# Patient Record
Sex: FEMALE | Race: ASIAN | NOT HISPANIC OR LATINO | URBAN - METROPOLITAN AREA
[De-identification: names, ages, dates, MRNs, and addresses within clinical notes are randomized per-mention and may not be internally consistent; named-entity substitution may affect disease eponyms.]

---

## 2019-06-26 ENCOUNTER — OUTPATIENT (OUTPATIENT)
Dept: OUTPATIENT SERVICES | Facility: HOSPITAL | Age: 39
LOS: 1 days | End: 2019-06-26

## 2019-06-26 VITALS
HEIGHT: 63 IN | DIASTOLIC BLOOD PRESSURE: 70 MMHG | HEART RATE: 88 BPM | WEIGHT: 160.06 LBS | TEMPERATURE: 99 F | RESPIRATION RATE: 16 BRPM | SYSTOLIC BLOOD PRESSURE: 110 MMHG

## 2019-06-26 DIAGNOSIS — O24.414 GESTATIONAL DIABETES MELLITUS IN PREGNANCY, INSULIN CONTROLLED: ICD-10-CM

## 2019-06-26 DIAGNOSIS — O24.419 GESTATIONAL DIABETES MELLITUS IN PREGNANCY, UNSPECIFIED CONTROL: ICD-10-CM

## 2019-06-26 DIAGNOSIS — Z98.890 OTHER SPECIFIED POSTPROCEDURAL STATES: Chronic | ICD-10-CM

## 2019-06-26 LAB
ANION GAP SERPL CALC-SCNC: 12 MMO/L — SIGNIFICANT CHANGE UP (ref 7–14)
APPEARANCE UR: CLEAR — SIGNIFICANT CHANGE UP
BILIRUB UR-MCNC: NEGATIVE — SIGNIFICANT CHANGE UP
BLD GP AB SCN SERPL QL: NEGATIVE — SIGNIFICANT CHANGE UP
BLOOD UR QL VISUAL: NEGATIVE — SIGNIFICANT CHANGE UP
BUN SERPL-MCNC: 10 MG/DL — SIGNIFICANT CHANGE UP (ref 7–23)
CALCIUM SERPL-MCNC: 9.8 MG/DL — SIGNIFICANT CHANGE UP (ref 8.4–10.5)
CHLORIDE SERPL-SCNC: 103 MMOL/L — SIGNIFICANT CHANGE UP (ref 98–107)
CO2 SERPL-SCNC: 20 MMOL/L — LOW (ref 22–31)
COLOR SPEC: SIGNIFICANT CHANGE UP
CREAT SERPL-MCNC: 0.59 MG/DL — SIGNIFICANT CHANGE UP (ref 0.5–1.3)
GLUCOSE SERPL-MCNC: 91 MG/DL — SIGNIFICANT CHANGE UP (ref 70–99)
GLUCOSE UR-MCNC: NEGATIVE — SIGNIFICANT CHANGE UP
HCT VFR BLD CALC: 36.8 % — SIGNIFICANT CHANGE UP (ref 34.5–45)
HGB BLD-MCNC: 12.6 G/DL — SIGNIFICANT CHANGE UP (ref 11.5–15.5)
KETONES UR-MCNC: NEGATIVE — SIGNIFICANT CHANGE UP
LEUKOCYTE ESTERASE UR-ACNC: NEGATIVE — SIGNIFICANT CHANGE UP
MCHC RBC-ENTMCNC: 31 PG — SIGNIFICANT CHANGE UP (ref 27–34)
MCHC RBC-ENTMCNC: 34.2 % — SIGNIFICANT CHANGE UP (ref 32–36)
MCV RBC AUTO: 90.4 FL — SIGNIFICANT CHANGE UP (ref 80–100)
NITRITE UR-MCNC: NEGATIVE — SIGNIFICANT CHANGE UP
NRBC # FLD: 0 K/UL — SIGNIFICANT CHANGE UP (ref 0–0)
PH UR: 7 — SIGNIFICANT CHANGE UP (ref 5–8)
PLATELET # BLD AUTO: 419 K/UL — HIGH (ref 150–400)
PMV BLD: 9.7 FL — SIGNIFICANT CHANGE UP (ref 7–13)
POTASSIUM SERPL-MCNC: 4 MMOL/L — SIGNIFICANT CHANGE UP (ref 3.5–5.3)
POTASSIUM SERPL-SCNC: 4 MMOL/L — SIGNIFICANT CHANGE UP (ref 3.5–5.3)
PROT UR-MCNC: 20 — SIGNIFICANT CHANGE UP
RBC # BLD: 4.07 M/UL — SIGNIFICANT CHANGE UP (ref 3.8–5.2)
RBC # FLD: 14 % — SIGNIFICANT CHANGE UP (ref 10.3–14.5)
RH IG SCN BLD-IMP: POSITIVE — SIGNIFICANT CHANGE UP
SODIUM SERPL-SCNC: 135 MMOL/L — SIGNIFICANT CHANGE UP (ref 135–145)
SP GR SPEC: 1.02 — SIGNIFICANT CHANGE UP (ref 1–1.04)
UROBILINOGEN FLD QL: NORMAL — SIGNIFICANT CHANGE UP
WBC # BLD: 12.31 K/UL — HIGH (ref 3.8–10.5)
WBC # FLD AUTO: 12.31 K/UL — HIGH (ref 3.8–10.5)

## 2019-06-26 RX ORDER — FAMOTIDINE 10 MG/ML
1 INJECTION INTRAVENOUS
Qty: 0 | Refills: 0 | DISCHARGE

## 2019-06-26 NOTE — OB PST NOTE - NSHPPHYSICALEXAM_GEN_ALL_CORE
Constitutional: Well Developed, Well Groomed, Well Nourished, No Distress    Eyes: PERRL, EOMI, conjunctiva clear    Mouth & Gums: Normal, moist    Pharynx: No tenderness, discharge, or peritonsillar abscess    Neck: Supple, no JVD    Breast: Normal shape    Back: Normal shape, ROM intact, strength intact, no vertebral tenderness    Respiratory: Airway patent, breath sounds equal, good air movement, respiration non-labored, clear to auscultation bilateral, no chest wall tenderness, no intercostal retractions, no rales, no wheezes, no rhonchi, no subcutaneous emphysema    Cardiovascular:  Regular rate and rhythm, no rubs or murmur, normal PMI    Gastrointestinal: + Gravid abdomen. Soft, non-tender, non distention,  bowel sound normal    Extremities: No clubbing, cyanosis, or pedal edema    Vascular:  Radial Pulse normal, DP pulse normal    Neurological: alert & oriented x 3, sensation intact, deep reflexes intact, cranial nerve intact, normal strength    Skin: warm and dry, normal color    Lymph Nodes: normal posterior cervical lymph node, normal anterior cervical lymph node, normal supraclavicular lymph node    Musculoskeletal: ROM intact, no joint swelling, warmth, or calf tenderness. Normal strength    Psychiatric: normal affect, normal behavior

## 2019-06-26 NOTE — OB PST NOTE - HISTORY OF PRESENT ILLNESS
37 yo pregnant female presents to pre surgical testing.  Pt reports she is , GALA 19.  Pt denies vaginal bleeding, spotting, or leakage of amniotic fluid.  Pt reports positive fetal movement today.  Pt denies regular uterine contractions.  Pt reports she was diagnosed with gestational DM about 2 months ago, currently taking metformin BID. Pt is scheduled for induction of labor on 19.

## 2019-06-26 NOTE — OB PST NOTE - PROBLEM SELECTOR PLAN 1
Pt is scheduled for induction of labor on 6/30/19.  Pre induction instructions given and pt able to verbalize understanding.  CBC BMP UA RPR T&S pending.

## 2019-06-27 LAB — T PALLIDUM AB TITR SER: NEGATIVE — SIGNIFICANT CHANGE UP

## 2019-06-30 ENCOUNTER — INPATIENT (INPATIENT)
Facility: HOSPITAL | Age: 39
LOS: 3 days | Discharge: HOME | End: 2019-07-04
Attending: OBSTETRICS & GYNECOLOGY | Admitting: OBSTETRICS & GYNECOLOGY

## 2019-06-30 VITALS
SYSTOLIC BLOOD PRESSURE: 116 MMHG | HEART RATE: 96 BPM | TEMPERATURE: 99 F | OXYGEN SATURATION: 97 % | DIASTOLIC BLOOD PRESSURE: 77 MMHG

## 2019-06-30 DIAGNOSIS — Z98.890 OTHER SPECIFIED POSTPROCEDURAL STATES: Chronic | ICD-10-CM

## 2019-06-30 DIAGNOSIS — O16.9 UNSPECIFIED MATERNAL HYPERTENSION, UNSPECIFIED TRIMESTER: ICD-10-CM

## 2019-07-01 PROBLEM — O24.419 GESTATIONAL DIABETES MELLITUS IN PREGNANCY, UNSPECIFIED CONTROL: Chronic | Status: ACTIVE | Noted: 2019-06-26

## 2019-07-01 LAB
BASOPHILS # BLD AUTO: 0.05 K/UL — SIGNIFICANT CHANGE UP (ref 0–0.2)
BASOPHILS NFR BLD AUTO: 0.4 % — SIGNIFICANT CHANGE UP (ref 0–2)
EOSINOPHIL # BLD AUTO: 0.19 K/UL — SIGNIFICANT CHANGE UP (ref 0–0.5)
EOSINOPHIL NFR BLD AUTO: 1.5 % — SIGNIFICANT CHANGE UP (ref 0–6)
HCT VFR BLD CALC: 35.8 % — SIGNIFICANT CHANGE UP (ref 34.5–45)
HGB BLD-MCNC: 12.3 G/DL — SIGNIFICANT CHANGE UP (ref 11.5–15.5)
IMM GRANULOCYTES NFR BLD AUTO: 1.2 % — SIGNIFICANT CHANGE UP (ref 0–1.5)
LYMPHOCYTES # BLD AUTO: 2.69 K/UL — SIGNIFICANT CHANGE UP (ref 1–3.3)
LYMPHOCYTES # BLD AUTO: 20.6 % — SIGNIFICANT CHANGE UP (ref 13–44)
MCHC RBC-ENTMCNC: 31.3 PG — SIGNIFICANT CHANGE UP (ref 27–34)
MCHC RBC-ENTMCNC: 34.4 % — SIGNIFICANT CHANGE UP (ref 32–36)
MCV RBC AUTO: 91.1 FL — SIGNIFICANT CHANGE UP (ref 80–100)
MONOCYTES # BLD AUTO: 1.12 K/UL — HIGH (ref 0–0.9)
MONOCYTES NFR BLD AUTO: 8.6 % — SIGNIFICANT CHANGE UP (ref 2–14)
NEUTROPHILS # BLD AUTO: 8.86 K/UL — HIGH (ref 1.8–7.4)
NEUTROPHILS NFR BLD AUTO: 67.7 % — SIGNIFICANT CHANGE UP (ref 43–77)
NRBC # FLD: 0 K/UL — SIGNIFICANT CHANGE UP (ref 0–0)
PLATELET # BLD AUTO: 378 K/UL — SIGNIFICANT CHANGE UP (ref 150–400)
PMV BLD: 9.6 FL — SIGNIFICANT CHANGE UP (ref 7–13)
RBC # BLD: 3.93 M/UL — SIGNIFICANT CHANGE UP (ref 3.8–5.2)
RBC # FLD: 13.9 % — SIGNIFICANT CHANGE UP (ref 10.3–14.5)
RH IG SCN BLD-IMP: POSITIVE — SIGNIFICANT CHANGE UP
T PALLIDUM AB TITR SER: NEGATIVE — SIGNIFICANT CHANGE UP
WBC # BLD: 13.07 K/UL — HIGH (ref 3.8–10.5)
WBC # FLD AUTO: 13.07 K/UL — HIGH (ref 3.8–10.5)

## 2019-07-01 RX ORDER — VANCOMYCIN HCL 1 G
1000 VIAL (EA) INTRAVENOUS EVERY 12 HOURS
Refills: 0 | Status: DISCONTINUED | OUTPATIENT
Start: 2019-07-01 | End: 2019-07-02

## 2019-07-01 RX ORDER — SODIUM CHLORIDE 9 MG/ML
1000 INJECTION, SOLUTION INTRAVENOUS
Refills: 0 | Status: DISCONTINUED | OUTPATIENT
Start: 2019-07-01 | End: 2019-07-01

## 2019-07-01 RX ORDER — SODIUM CHLORIDE 9 MG/ML
1000 INJECTION, SOLUTION INTRAVENOUS
Refills: 0 | Status: COMPLETED | OUTPATIENT
Start: 2019-07-01 | End: 2019-07-01

## 2019-07-01 RX ORDER — VANCOMYCIN HCL 1 G
VIAL (EA) INTRAVENOUS
Refills: 0 | Status: DISCONTINUED | OUTPATIENT
Start: 2019-07-01 | End: 2019-07-02

## 2019-07-01 RX ORDER — VANCOMYCIN HCL 1 G
1000 VIAL (EA) INTRAVENOUS ONCE
Refills: 0 | Status: COMPLETED | OUTPATIENT
Start: 2019-07-01 | End: 2019-07-01

## 2019-07-01 RX ORDER — OXYTOCIN 10 UNIT/ML
333.33 VIAL (ML) INJECTION
Qty: 20 | Refills: 0 | Status: COMPLETED | OUTPATIENT
Start: 2019-07-01 | End: 2019-07-01

## 2019-07-01 RX ORDER — AMPICILLIN TRIHYDRATE 250 MG
2 CAPSULE ORAL ONCE
Refills: 0 | Status: DISCONTINUED | OUTPATIENT
Start: 2019-07-01 | End: 2019-07-01

## 2019-07-01 RX ORDER — AMPICILLIN TRIHYDRATE 250 MG
1 CAPSULE ORAL EVERY 4 HOURS
Refills: 0 | Status: DISCONTINUED | OUTPATIENT
Start: 2019-07-01 | End: 2019-07-01

## 2019-07-01 RX ORDER — SODIUM CHLORIDE 9 MG/ML
1000 INJECTION INTRAMUSCULAR; INTRAVENOUS; SUBCUTANEOUS
Refills: 0 | Status: DISCONTINUED | OUTPATIENT
Start: 2019-07-01 | End: 2019-07-01

## 2019-07-01 RX ORDER — CITRIC ACID/SODIUM CITRATE 300-500 MG
15 SOLUTION, ORAL ORAL EVERY 6 HOURS
Refills: 0 | Status: DISCONTINUED | OUTPATIENT
Start: 2019-07-01 | End: 2019-07-02

## 2019-07-01 RX ORDER — OXYTOCIN 10 UNIT/ML
333.33 VIAL (ML) INJECTION
Qty: 20 | Refills: 0 | Status: DISCONTINUED | OUTPATIENT
Start: 2019-07-01 | End: 2019-07-01

## 2019-07-01 RX ORDER — SODIUM CHLORIDE 9 MG/ML
1000 INJECTION, SOLUTION INTRAVENOUS
Refills: 0 | Status: DISCONTINUED | OUTPATIENT
Start: 2019-07-01 | End: 2019-07-02

## 2019-07-01 RX ORDER — SODIUM CHLORIDE 9 MG/ML
1000 INJECTION INTRAMUSCULAR; INTRAVENOUS; SUBCUTANEOUS
Refills: 0 | Status: COMPLETED | OUTPATIENT
Start: 2019-07-01 | End: 2019-07-01

## 2019-07-01 RX ORDER — CITRIC ACID/SODIUM CITRATE 300-500 MG
15 SOLUTION, ORAL ORAL EVERY 6 HOURS
Refills: 0 | Status: DISCONTINUED | OUTPATIENT
Start: 2019-07-01 | End: 2019-07-01

## 2019-07-01 RX ADMIN — SODIUM CHLORIDE 125 MILLILITER(S): 9 INJECTION INTRAMUSCULAR; INTRAVENOUS; SUBCUTANEOUS at 21:16

## 2019-07-01 RX ADMIN — Medication 250 MILLIGRAM(S): at 04:08

## 2019-07-01 RX ADMIN — SODIUM CHLORIDE 125 MILLILITER(S): 9 INJECTION, SOLUTION INTRAVENOUS at 08:20

## 2019-07-01 RX ADMIN — Medication 250 MILLIGRAM(S): at 16:30

## 2019-07-01 NOTE — CHART NOTE - NSCHARTNOTEFT_GEN_A_CORE
R3 labor Note    Pt evaluated for increased pain with ctx. Pt SROM-clear R3 labor Note    Pt evaluated for increased pain with ctx. Pt SROM-clear    SVE: 1-2/60/-3  EFM: 180 bpm/mod saeid/+accel/-decel  [place]: irregular 3-6 min    Vital Signs Last 24 Hrs  T(C): 37.0 (01 Jul 2019 21:46), Max: 37.1 (30 Jun 2019 23:25)  T(F): 98.6 (01 Jul 2019 21:46), Max: 98.8 (01 Jul 2019 01:10)  HR: 80 (01 Jul 2019 17:43) (71 - 94)  BP: 119/79 (01 Jul 2019 17:43) (107/67 - 119/79)  BP(mean): --  RR: 16 (01 Jul 2019 01:10) (16 - 16)  SpO2: 97% (01 Jul 2019 04:06) (97% - 98%)    I&O's Detail    30 Jun 2019 07:01  -  01 Jul 2019 07:00  --------------------------------------------------------  IN:    dextrose 5% + sodium chloride 0.9%: 500 mL    IV PiggyBack: 250 mL    lactated ringers.: 500 mL  Total IN: 1250 mL    OUT:  Total OUT: 0 mL    Total NET: 1250 mL      01 Jul 2019 07:01  -  01 Jul 2019 22:07  --------------------------------------------------------  IN:    dextrose 5% + sodium chloride 0.9%: 1375 mL  Total IN: 1375 mL    OUT:  Total OUT: 0 mL    Total NET: 1375 mL                                12.3   13.07 )-----------( 378      ( 01 Jul 2019 01:15 )             35.8               CAPILLARY BLOOD GLUCOSE      POCT Blood Glucose.: 104 mg/dL (01 Jul 2019 21:04)  POCT Blood Glucose.: 96 mg/dL (01 Jul 2019 16:34)  POCT Blood Glucose.: 100 mg/dL (01 Jul 2019 12:31)  POCT Blood Glucose.: 99 mg/dL (01 Jul 2019 08:16)  POCT Blood Glucose.: 96 mg/dL (01 Jul 2019 04:13)  POCT Blood Glucose.: 107 mg/dL (30 Jun 2019 23:58)        Plan  Cat 2 tracing, resuscitate with lateral tilt and IVF bolus 500cc  Pt afebrile. If fetal tachycardia does not resolve with resuscitation, move to L&D  Continue IOL with PO cytotec    D/w Dr. Zamora, will inform Dr. Spencer Howe PGY3

## 2019-07-01 NOTE — OB PROVIDER H&P - HISTORY OF PRESENT ILLNESS
39yo @ 39w1d presents for iol for GDMA 2. Patient has no complaints at this 39yo @ 39w1d presents for iol for GDMA 2. Patient has no complaints at this time.     PNC: GDMA2 on metfomin 1000 BID. GBS +. EFW 7#    obhx: topx1 4 wks  gynhx: denies  medhx: denies  surghx: L rotator cuff repair  meds: metformin 1000 BID, PNV, pepcid  all: motrin (hives), penicillin  social: denies toxic habits    SVE:0/0/-3  FHT: 37yo @ 39w1d presents for iol for GDMA 2. Patient has no complaints at this time.     PNC: GDMA2 on metfomin 1000 BID. GBS +. EFW 7#    obhx: topx1 4 wks  gynhx: denies  medhx: denies  surghx: L rotator cuff repair  meds: metformin 1000 BID, PNV, pepcid  all: motrin (hives), penicillin  social: denies toxic habits    SVE:0/0/-3  FHT: 130/ mod saeid/ + acels/ no decels   toco: [place] irregularly

## 2019-07-01 NOTE — OB PROVIDER IHI INDUCTION/AUGMENTATION NOTE - NS_CHECKALL_OBGYN_ALL_OB
Contractions pattern was reviewed/Order was written/FHR was reviewed/Induction / Augmentation was discussed/H&P was completed

## 2019-07-01 NOTE — CHART NOTE - NSCHARTNOTEFT_GEN_A_CORE
NP tracing note     @39+1w for A2 IOL  VSS, afebrile, BGM 96, 100  140/mod saeid/+ accels/no decels   [place] q2-4min  SVE deferred  -Cont PO cytotec  -Cont BGM monitoring  sredze, NP

## 2019-07-01 NOTE — OB PROVIDER H&P - GRAVIDA, OB PROFILE
Call made to the pt using two identifiers, name and . Pt given lab results of swab being positive for Trich. Pt  made aware that the provider sent medication to her pharmacy, have partner go and get treated, refrain from any sexual intercourse until 1 week after completing the course of antibiotic. Pt also advised to refrain from drinking any alcoholoc beverages while on the medication because it can cause her to become very ill. Pt verbalized understanding and stated that she went to the ER on 18 and she had a dose of IV Flagyl and wanted to know if she still needs to take the po medication. After speaking with Dr. Elpidio Longoria, he stated yes she still needs to take the po medication. Pt given that information and verbalized understanding. 2

## 2019-07-01 NOTE — OB PROVIDER H&P - ASSESSMENT
37yo @ 39w1d presents for iol for GDMA 2.    - Vanc for GBS ppx, no sensitivities and patient is pcn allergic  - PO cytotec  - rotating IVF  - fingersticks  - IV  - labs  - continuous monitoring      LEDA Bang PGY-4

## 2019-07-02 LAB
BASOPHILS # BLD AUTO: 0.05 K/UL — SIGNIFICANT CHANGE UP (ref 0–0.2)
BASOPHILS NFR BLD AUTO: 0.2 % — SIGNIFICANT CHANGE UP (ref 0–2)
BASOPHILS NFR SPEC: 0 % — SIGNIFICANT CHANGE UP (ref 0–2)
BLASTS # FLD: 0 % — SIGNIFICANT CHANGE UP (ref 0–0)
EOSINOPHIL # BLD AUTO: 0.02 K/UL — SIGNIFICANT CHANGE UP (ref 0–0.5)
EOSINOPHIL NFR BLD AUTO: 0.1 % — SIGNIFICANT CHANGE UP (ref 0–6)
EOSINOPHIL NFR FLD: 0.9 % — SIGNIFICANT CHANGE UP (ref 0–6)
GIANT PLATELETS BLD QL SMEAR: PRESENT — SIGNIFICANT CHANGE UP
HCT VFR BLD CALC: 28.1 % — LOW (ref 34.5–45)
HGB BLD-MCNC: 9.7 G/DL — LOW (ref 11.5–15.5)
IMM GRANULOCYTES NFR BLD AUTO: 1 % — SIGNIFICANT CHANGE UP (ref 0–1.5)
LYMPHOCYTES # BLD AUTO: 2.12 K/UL — SIGNIFICANT CHANGE UP (ref 1–3.3)
LYMPHOCYTES # BLD AUTO: 7.8 % — LOW (ref 13–44)
LYMPHOCYTES NFR SPEC AUTO: 3.5 % — LOW (ref 13–44)
MANUAL SMEAR VERIFICATION: SIGNIFICANT CHANGE UP
MCHC RBC-ENTMCNC: 31.5 PG — SIGNIFICANT CHANGE UP (ref 27–34)
MCHC RBC-ENTMCNC: 34.5 % — SIGNIFICANT CHANGE UP (ref 32–36)
MCV RBC AUTO: 91.2 FL — SIGNIFICANT CHANGE UP (ref 80–100)
METAMYELOCYTES # FLD: 0 % — SIGNIFICANT CHANGE UP (ref 0–1)
MONOCYTES # BLD AUTO: 2.13 K/UL — HIGH (ref 0–0.9)
MONOCYTES NFR BLD AUTO: 7.8 % — SIGNIFICANT CHANGE UP (ref 2–14)
MONOCYTES NFR BLD: 2.6 % — SIGNIFICANT CHANGE UP (ref 2–9)
MORPHOLOGY BLD-IMP: NORMAL — SIGNIFICANT CHANGE UP
MYELOCYTES NFR BLD: 0 % — SIGNIFICANT CHANGE UP (ref 0–0)
NEUTROPHIL AB SER-ACNC: 86 % — HIGH (ref 43–77)
NEUTROPHILS # BLD AUTO: 22.74 K/UL — HIGH (ref 1.8–7.4)
NEUTROPHILS NFR BLD AUTO: 83.1 % — HIGH (ref 43–77)
NEUTS BAND # BLD: 6.1 % — HIGH (ref 0–6)
NRBC # FLD: 0 K/UL — SIGNIFICANT CHANGE UP (ref 0–0)
OTHER - HEMATOLOGY %: 0 — SIGNIFICANT CHANGE UP
PLATELET # BLD AUTO: 350 K/UL — SIGNIFICANT CHANGE UP (ref 150–400)
PLATELET COUNT - ESTIMATE: NORMAL — SIGNIFICANT CHANGE UP
PMV BLD: 9.6 FL — SIGNIFICANT CHANGE UP (ref 7–13)
PROMYELOCYTES # FLD: 0 % — SIGNIFICANT CHANGE UP (ref 0–0)
RBC # BLD: 3.08 M/UL — LOW (ref 3.8–5.2)
RBC # FLD: 14.1 % — SIGNIFICANT CHANGE UP (ref 10.3–14.5)
SMUDGE CELLS # BLD: PRESENT — SIGNIFICANT CHANGE UP
VARIANT LYMPHS # BLD: 0.9 % — SIGNIFICANT CHANGE UP
WBC # BLD: 27.32 K/UL — HIGH (ref 3.8–10.5)
WBC # FLD AUTO: 27.32 K/UL — HIGH (ref 3.8–10.5)

## 2019-07-02 RX ORDER — ACETAMINOPHEN 500 MG
975 TABLET ORAL
Refills: 0 | Status: DISCONTINUED | OUTPATIENT
Start: 2019-07-02 | End: 2019-07-04

## 2019-07-02 RX ORDER — TETANUS TOXOID, REDUCED DIPHTHERIA TOXOID AND ACELLULAR PERTUSSIS VACCINE, ADSORBED 5; 2.5; 8; 8; 2.5 [IU]/.5ML; [IU]/.5ML; UG/.5ML; UG/.5ML; UG/.5ML
0.5 SUSPENSION INTRAMUSCULAR ONCE
Refills: 0 | Status: DISCONTINUED | OUTPATIENT
Start: 2019-07-02 | End: 2019-07-04

## 2019-07-02 RX ORDER — DOCUSATE SODIUM 100 MG
100 CAPSULE ORAL
Refills: 0 | Status: DISCONTINUED | OUTPATIENT
Start: 2019-07-02 | End: 2019-07-04

## 2019-07-02 RX ORDER — AER TRAVELER 0.5 G/1
1 SOLUTION RECTAL; TOPICAL EVERY 4 HOURS
Refills: 0 | Status: DISCONTINUED | OUTPATIENT
Start: 2019-07-02 | End: 2019-07-04

## 2019-07-02 RX ORDER — METFORMIN HYDROCHLORIDE 850 MG/1
1 TABLET ORAL
Qty: 0 | Refills: 0 | DISCHARGE

## 2019-07-02 RX ORDER — BENZOCAINE 10 %
1 GEL (GRAM) MUCOUS MEMBRANE EVERY 6 HOURS
Refills: 0 | Status: DISCONTINUED | OUTPATIENT
Start: 2019-07-02 | End: 2019-07-04

## 2019-07-02 RX ORDER — SODIUM CHLORIDE 9 MG/ML
3 INJECTION INTRAMUSCULAR; INTRAVENOUS; SUBCUTANEOUS EVERY 8 HOURS
Refills: 0 | Status: DISCONTINUED | OUTPATIENT
Start: 2019-07-02 | End: 2019-07-04

## 2019-07-02 RX ORDER — DOCUSATE SODIUM 100 MG
100 CAPSULE ORAL
Refills: 0 | Status: DISCONTINUED | OUTPATIENT
Start: 2019-07-02 | End: 2019-07-02

## 2019-07-02 RX ORDER — KETOROLAC TROMETHAMINE 30 MG/ML
30 SYRINGE (ML) INJECTION ONCE
Refills: 0 | Status: DISCONTINUED | OUTPATIENT
Start: 2019-07-02 | End: 2019-07-02

## 2019-07-02 RX ORDER — MORPHINE SULFATE 50 MG/1
1 CAPSULE, EXTENDED RELEASE ORAL
Qty: 0 | Refills: 0 | DISCHARGE

## 2019-07-02 RX ORDER — DOCUSATE SODIUM 100 MG
1 CAPSULE ORAL
Qty: 0 | Refills: 0 | DISCHARGE
Start: 2019-07-02

## 2019-07-02 RX ORDER — GLYCERIN ADULT
1 SUPPOSITORY, RECTAL RECTAL AT BEDTIME
Refills: 0 | Status: DISCONTINUED | OUTPATIENT
Start: 2019-07-02 | End: 2019-07-04

## 2019-07-02 RX ORDER — DIBUCAINE 1 %
1 OINTMENT (GRAM) RECTAL EVERY 6 HOURS
Refills: 0 | Status: DISCONTINUED | OUTPATIENT
Start: 2019-07-02 | End: 2019-07-04

## 2019-07-02 RX ORDER — IBUPROFEN 200 MG
600 TABLET ORAL EVERY 6 HOURS
Refills: 0 | Status: DISCONTINUED | OUTPATIENT
Start: 2019-07-02 | End: 2019-07-02

## 2019-07-02 RX ORDER — OXYCODONE HYDROCHLORIDE 5 MG/1
5 TABLET ORAL
Refills: 0 | Status: DISCONTINUED | OUTPATIENT
Start: 2019-07-02 | End: 2019-07-04

## 2019-07-02 RX ORDER — DIPHENHYDRAMINE HCL 50 MG
25 CAPSULE ORAL EVERY 6 HOURS
Refills: 0 | Status: DISCONTINUED | OUTPATIENT
Start: 2019-07-02 | End: 2019-07-04

## 2019-07-02 RX ORDER — SODIUM CHLORIDE 9 MG/ML
1000 INJECTION INTRAMUSCULAR; INTRAVENOUS; SUBCUTANEOUS
Refills: 0 | Status: DISCONTINUED | OUTPATIENT
Start: 2019-07-02 | End: 2019-07-02

## 2019-07-02 RX ORDER — FERROUS SULFATE 325(65) MG
325 TABLET ORAL THREE TIMES A DAY
Refills: 0 | Status: DISCONTINUED | OUTPATIENT
Start: 2019-07-02 | End: 2019-07-04

## 2019-07-02 RX ORDER — HYDROCORTISONE 1 %
1 OINTMENT (GRAM) TOPICAL EVERY 6 HOURS
Refills: 0 | Status: DISCONTINUED | OUTPATIENT
Start: 2019-07-02 | End: 2019-07-04

## 2019-07-02 RX ORDER — LANOLIN
1 OINTMENT (GRAM) TOPICAL EVERY 6 HOURS
Refills: 0 | Status: DISCONTINUED | OUTPATIENT
Start: 2019-07-02 | End: 2019-07-04

## 2019-07-02 RX ORDER — OXYCODONE HYDROCHLORIDE 5 MG/1
5 TABLET ORAL ONCE
Refills: 0 | Status: DISCONTINUED | OUTPATIENT
Start: 2019-07-02 | End: 2019-07-04

## 2019-07-02 RX ORDER — MAGNESIUM HYDROXIDE 400 MG/1
30 TABLET, CHEWABLE ORAL
Refills: 0 | Status: DISCONTINUED | OUTPATIENT
Start: 2019-07-02 | End: 2019-07-04

## 2019-07-02 RX ORDER — SODIUM CHLORIDE 9 MG/ML
1000 INJECTION, SOLUTION INTRAVENOUS
Refills: 0 | Status: DISCONTINUED | OUTPATIENT
Start: 2019-07-02 | End: 2019-07-02

## 2019-07-02 RX ORDER — ASCORBIC ACID 60 MG
500 TABLET,CHEWABLE ORAL DAILY
Refills: 0 | Status: DISCONTINUED | OUTPATIENT
Start: 2019-07-02 | End: 2019-07-04

## 2019-07-02 RX ORDER — SIMETHICONE 80 MG/1
80 TABLET, CHEWABLE ORAL EVERY 4 HOURS
Refills: 0 | Status: DISCONTINUED | OUTPATIENT
Start: 2019-07-02 | End: 2019-07-04

## 2019-07-02 RX ORDER — ACETAMINOPHEN 500 MG
3 TABLET ORAL
Qty: 0 | Refills: 0 | DISCHARGE
Start: 2019-07-02

## 2019-07-02 RX ORDER — PRAMOXINE HYDROCHLORIDE 150 MG/15G
1 AEROSOL, FOAM RECTAL EVERY 4 HOURS
Refills: 0 | Status: DISCONTINUED | OUTPATIENT
Start: 2019-07-02 | End: 2019-07-04

## 2019-07-02 RX ADMIN — Medication 500 MILLIGRAM(S): at 23:58

## 2019-07-02 RX ADMIN — Medication 975 MILLIGRAM(S): at 15:50

## 2019-07-02 RX ADMIN — Medication 975 MILLIGRAM(S): at 14:52

## 2019-07-02 RX ADMIN — SODIUM CHLORIDE 3 MILLILITER(S): 9 INJECTION INTRAMUSCULAR; INTRAVENOUS; SUBCUTANEOUS at 14:50

## 2019-07-02 RX ADMIN — Medication 325 MILLIGRAM(S): at 23:58

## 2019-07-02 RX ADMIN — Medication 975 MILLIGRAM(S): at 22:20

## 2019-07-02 RX ADMIN — Medication 100 MILLIGRAM(S): at 17:18

## 2019-07-02 RX ADMIN — SODIUM CHLORIDE 3 MILLILITER(S): 9 INJECTION INTRAMUSCULAR; INTRAVENOUS; SUBCUTANEOUS at 23:58

## 2019-07-02 RX ADMIN — Medication 250 MILLIGRAM(S): at 05:10

## 2019-07-02 RX ADMIN — Medication 975 MILLIGRAM(S): at 21:37

## 2019-07-02 RX ADMIN — Medication 1000 MILLIUNIT(S)/MIN: at 09:15

## 2019-07-02 NOTE — DISCHARGE NOTE OB - CARE PLAN
Principal Discharge DX:	Vaginal delivery  Goal:	Return to baseline  Assessment and plan of treatment:	Normal post partum  Stable to be discharged home

## 2019-07-02 NOTE — OB RN DELIVERY SUMMARY - NS_INTRAPARTUMABXTYPE_OBGYN_ALL_OB
Broad spectrum antibiotics > 4 hrs prior to birth No antibiotics or any antibiotics < 2 hrs prior to birth

## 2019-07-02 NOTE — PROVIDER CONTACT NOTE (OTHER) - ASSESSMENT
Bp-106/62,heartrate 96,02 sat 100%.Pt stated she has eaten and drank a lot of fluids.Lochia not excessive.Fundus firm at umbilicus.

## 2019-07-02 NOTE — CHART NOTE - NSCHARTNOTEFT_GEN_A_CORE
Patient is s/p  Day 0 who states she is feeling well, but tired and a sore in her perineum.  vital signs are stable  Uterus is firm   She denies a lot of vaginal bleeding  She has ice packs to her perineum which she says is helping  Indwelling stanton catheter secondary to urinary retention most likely from perineal swelling as she had a third degree laceration.    A/P:   Patient is post partum Day 0 with urinary retention  D/c stanton in AM  CBC in AM  Continue post partum care

## 2019-07-02 NOTE — DISCHARGE NOTE OB - MEDICATION SUMMARY - MEDICATIONS TO TAKE
I will START or STAY ON the medications listed below when I get home from the hospital:    acetaminophen 325 mg oral tablet  -- 3 tab(s) by mouth   -- Indication: For     docusate sodium 100 mg oral capsule  -- 1 cap(s) by mouth 2 times a day  -- Indication: For

## 2019-07-02 NOTE — OB RN DELIVERY SUMMARY - NS_SEPSISRSKCALC_OBGYN_ALL_OB_FT
EOS calculated successfully. EOS Risk Factor: 0.5/1000 live births ([facility] national incidence); GA=39w2d; Temp=98.8; ROM=12; GBS='Positive'; Antibiotics='Broad spectrum antibiotics > 4 hrs prior to birth' EOS calculated successfully. EOS Risk Factor: 0.5/1000 live births ([facility] national incidence); GA=39w2d; Temp=98.8; ROM=12; GBS='Positive'; Antibiotics='No antibiotics or any antibiotics < 2 hrs prior to birth'

## 2019-07-02 NOTE — DISCHARGE NOTE OB - MEDICATION SUMMARY - MEDICATIONS TO STOP TAKING
I will STOP taking the medications listed below when I get home from the hospital:  None I will STOP taking the medications listed below when I get home from the hospital:    metFORMIN 1000 mg oral tablet  -- 1 tab(s) by mouth 2 times a day    morphine  -- 1 dose(s) intramuscular once. Given once in [facility] 2 weeks ago, pt had severe back pain r/t back strain

## 2019-07-02 NOTE — DISCHARGE NOTE OB - PATIENT PORTAL LINK FT
You can access the [facility], offered by [facility], by registering with the following website: [email or web address]

## 2019-07-02 NOTE — CHART NOTE - NSCHARTNOTEFT_GEN_A_CORE
R1 Progress Note    Patient seen and examined at bedside for Category 2 FHT.       NAD  Vital Signs Last 24 Hrs  T(C): 36.9 (2019 02:09), Max: 37.0 (2019 21:46)  T(F): 98.42 (2019 02:09), Max: 98.6 (2019 21:46)  HR: 75 (2019 05:44) (51 - 84)  BP: 113/68 (2019 05:40) (104/64 - 141/81)  BP(mean): --  RR: --  SpO2: 100% (2019 05:44) (95% - 100%)    SVE 10/100/+1  /mod variability/+accels/+intermittent earlies, lates  TOCO ctx q3-4m    A/P 38y  at 39w2d IoL A2     - labor: fully dilated, anticipate   - fetus: cat 2 FHT, c/w intrauterine resuscitation  - gbs: pos, vanc  - pain: s/p epidrual, no complaints  - A2: FSG < 110, c/w FSG and rotating fluids per protocol    d/w Dr. Cary and Dr. Azul, in house  Dr. Jacob aware, en route  VWhite PGY2

## 2019-07-02 NOTE — CHART NOTE - NSCHARTNOTEFT_GEN_A_CORE
R1 OB Progress Note    Patient seen and evaluated at bedside.  Endorses increasing cramping pain with contractions. Currently without epidural.     T(C): 37.0 (07-01-19 @ 21:46), Max: 37.1 (07-01-19 @ 01:10)  HR: 78 (07-01-19 @ 22:20) (71 - 94)  BP: 134/86 (07-01-19 @ 22:20) (107/67 - 134/86)  RR: 16 (07-01-19 @ 01:10) (16 - 16)  SpO2: 97% (07-01-19 @ 04:06) (97% - 98%)    SVE: 2.5/70/-3   mid position, soft     EFM: 145/mod saeid/+accel,-decel  [place]:  q3-4 mins     A/P 38y P0 admitted for IOL for GDMA2  -Labor: on PO cytotec.   making cervical change.   consider switching to pitocin after epidural.  -Fetus: category 1 tracing  -GBS positive: continue Vancomycin for ppx   -Analgesia: will call for epidural     Mya Russo   d/w R1 OB Progress Note    Patient seen and evaluated at bedside.  Endorses increasing cramping pain with contractions. Currently without epidural.     T(C): 37.0 (07-01-19 @ 21:46), Max: 37.1 (07-01-19 @ 01:10)  HR: 78 (07-01-19 @ 22:20) (71 - 94)  BP: 134/86 (07-01-19 @ 22:20) (107/67 - 134/86)  RR: 16 (07-01-19 @ 01:10) (16 - 16)  SpO2: 97% (07-01-19 @ 04:06) (97% - 98%)    SVE: 2.5/70/-3   mid position, soft     EFM: 145/mod saeid/+accel,-decel  [place]:  q3-4 mins     A/P 38y P0 admitted for IOL for GDMA2  -Labor: on PO cytotec.   Making cervical change.   Plan to continue PO cytotec for now, will reevaluate after epidural and if significant progress is made consider beginning pitocin.  -Fetus: category 1 tracing  -GBS positive: continue Vancomycin for ppx   -Analgesia: called for epidural     Mya Russo, PGY1  d/w Dr. Palmer

## 2019-07-02 NOTE — OB PROVIDER DELIVERY SUMMARY - NSPROVIDERDELIVERYNOTE_OBGYN_ALL_OB_FT
of a live female baby. Placenta and membranes delivered spontaneously & intact.  Cervix & vagina inspected-Third degree extension of RML noted.  Extension & RML repaired in layers with chromic catgut.  Hemostasis assured.  Patient and mother in stable condition.

## 2019-07-02 NOTE — DISCHARGE NOTE OB - CARE PROVIDER_API CALL
Audrey Palmer)  Obstetrics and Gynecology  [address]  [place] [id number]  Phone: [phone]  Fax: [phone]  Follow Up Time:

## 2019-07-02 NOTE — DISCHARGE NOTE OB - HOSPITAL COURSE
Patient came in for induction of labor for GDM A2 and was induced with cytotec.  She progressed to fully dilated and delivered a viable female .  She had urinary retention .

## 2019-07-02 NOTE — CHART NOTE - NSCHARTNOTEFT_GEN_A_CORE
Patient assessed at 1450 due to reporting that she was feeling dizzy after attempting to void in bathroom. Upon assessment patient was in bed and states she still feels dizzy. Patient denies any headache, shortness of breath, difficulties breathing and/or blur vision. Patient states she has eaten and hydrated since delivery. Patient states she is due to void since delivery and states some bladder pressure but was unsuccessful in voiding.     Vital Signs  Vital Signs Last 24 Hrs  T(C): 36.7 (2019 13:22), Max: 37.0 (2019 21:46)  T(F): 98.1 (2019 13:22), Max: 98.6 (2019 21:46)  HR: 96 (2019 14:59) (51 - 123)  BP: 106/62 (2019 14:59) (94/55 - 141/81)  BP(mean): --  RR: 20 (2019 14:59) (18 - 20)  SpO2: 100% (2019 14:59) (87% - 100%)    Labs                        12.3   13.07 )-----------( 378      ( 2019 01:15 )             35.8     Assessment  39y/o  0day postpartum @ 0841 . EBL 400cc. Alert and oriented. No distress noted. Lung sounds clear bilaterally. Apical heart rate 96 regular rhythm. Abdomen soft, nontender and nondistended. Fundus firm. Lochia moderate rubra. Slight edema noted to perineum with no erythema noted. 3rd degree laceration WNL with hemorrhoid present. Lower extremities with no edema and/or erythema noted, nontender with positive pedal pulses bilateral.     Plan:  STAT CBC ordered  Encourage oral hydration.   Instructed not to get out of bed unassisted. Call bell within reach  Discussed attempting to void again, if unable patient should get bladder scanned.   Encourage ice pack and topical creams to assist with perineum edema.   Will continue to monitor. Patient assessed at 1450 due to reporting that she was feeling dizzy after attempting to void in bathroom. Upon assessment patient was in bed and states she still feels dizzy. Patient denies any headache, shortness of breath, difficulties breathing and/or blur vision. Patient states she has eaten and hydrated since delivery. Patient states she is due to void since delivery and states some bladder pressure but was unsuccessful in voiding.     Vital Signs  Vital Signs Last 24 Hrs  T(C): 36.7 (2019 13:22), Max: 37.0 (2019 21:46)  T(F): 98.1 (2019 13:22), Max: 98.6 (2019 21:46)  HR: 96 (2019 14:59) (51 - 123)  BP: 106/62 (2019 14:59) (94/55 - 141/81)  BP(mean): --  RR: 20 (2019 14:59) (18 - 20)  SpO2: 100% (2019 14:59) (87% - 100%)    Labs                        12.3   13.07 )-----------( 378      ( 2019 01:15 )             35.8     Assessment  37y/o  0day postpartum @ 0841 . EBL 400cc. history of gestational diabetes was on metformin. Alert and oriented. No distress noted. Lung sounds clear bilaterally. Apical heart rate 96 regular rhythm. Abdomen soft, nontender and nondistended. Fundus firm. Lochia moderate rubra. Slight edema noted to perineum with no erythema noted. 3rd degree laceration WNL with hemorrhoid present. Lower extremities with no edema and/or erythema noted, nontender with positive pedal pulses bilateral.     Plan:  STAT CBC ordered  Encourage oral hydration.   Instructed not to get out of bed unassisted. Call bell within reach  Discussed attempting to void again, if unable patient should get bladder scanned.   Encourage ice pack and topical creams to assist with perineum edema.   Will continue to monitor.

## 2019-07-03 LAB
BASOPHILS # BLD AUTO: 0.06 K/UL — SIGNIFICANT CHANGE UP (ref 0–0.2)
BASOPHILS NFR BLD AUTO: 0.3 % — SIGNIFICANT CHANGE UP (ref 0–2)
EOSINOPHIL # BLD AUTO: 0.26 K/UL — SIGNIFICANT CHANGE UP (ref 0–0.5)
EOSINOPHIL NFR BLD AUTO: 1.1 % — SIGNIFICANT CHANGE UP (ref 0–6)
HCT VFR BLD CALC: 23.1 % — LOW (ref 34.5–45)
HGB BLD-MCNC: 8 G/DL — LOW (ref 11.5–15.5)
IMM GRANULOCYTES NFR BLD AUTO: 0.8 % — SIGNIFICANT CHANGE UP (ref 0–1.5)
LYMPHOCYTES # BLD AUTO: 14.7 % — SIGNIFICANT CHANGE UP (ref 13–44)
LYMPHOCYTES # BLD AUTO: 3.43 K/UL — HIGH (ref 1–3.3)
MCHC RBC-ENTMCNC: 32.1 PG — SIGNIFICANT CHANGE UP (ref 27–34)
MCHC RBC-ENTMCNC: 34.6 % — SIGNIFICANT CHANGE UP (ref 32–36)
MCV RBC AUTO: 92.8 FL — SIGNIFICANT CHANGE UP (ref 80–100)
MONOCYTES # BLD AUTO: 1.76 K/UL — HIGH (ref 0–0.9)
MONOCYTES NFR BLD AUTO: 7.6 % — SIGNIFICANT CHANGE UP (ref 2–14)
NEUTROPHILS # BLD AUTO: 17.58 K/UL — HIGH (ref 1.8–7.4)
NEUTROPHILS NFR BLD AUTO: 75.5 % — SIGNIFICANT CHANGE UP (ref 43–77)
NRBC # FLD: 0 K/UL — SIGNIFICANT CHANGE UP (ref 0–0)
PLATELET # BLD AUTO: 300 K/UL — SIGNIFICANT CHANGE UP (ref 150–400)
PMV BLD: 10.1 FL — SIGNIFICANT CHANGE UP (ref 7–13)
RBC # BLD: 2.49 M/UL — LOW (ref 3.8–5.2)
RBC # FLD: 14.4 % — SIGNIFICANT CHANGE UP (ref 10.3–14.5)
WBC # BLD: 23.28 K/UL — HIGH (ref 3.8–10.5)
WBC # FLD AUTO: 23.28 K/UL — HIGH (ref 3.8–10.5)

## 2019-07-03 RX ORDER — TRAMADOL HYDROCHLORIDE 50 MG/1
25 TABLET ORAL THREE TIMES A DAY
Refills: 0 | Status: DISCONTINUED | OUTPATIENT
Start: 2019-07-03 | End: 2019-07-04

## 2019-07-03 RX ADMIN — Medication 1 TABLET(S): at 18:41

## 2019-07-03 RX ADMIN — TRAMADOL HYDROCHLORIDE 25 MILLIGRAM(S): 50 TABLET ORAL at 22:00

## 2019-07-03 RX ADMIN — Medication 100 MILLIGRAM(S): at 06:43

## 2019-07-03 RX ADMIN — Medication 975 MILLIGRAM(S): at 10:00

## 2019-07-03 RX ADMIN — Medication 100 MILLIGRAM(S): at 18:42

## 2019-07-03 RX ADMIN — TRAMADOL HYDROCHLORIDE 25 MILLIGRAM(S): 50 TABLET ORAL at 21:25

## 2019-07-03 RX ADMIN — Medication 975 MILLIGRAM(S): at 09:43

## 2019-07-03 RX ADMIN — Medication 325 MILLIGRAM(S): at 06:43

## 2019-07-03 RX ADMIN — Medication 975 MILLIGRAM(S): at 03:10

## 2019-07-03 RX ADMIN — SODIUM CHLORIDE 3 MILLILITER(S): 9 INJECTION INTRAMUSCULAR; INTRAVENOUS; SUBCUTANEOUS at 06:43

## 2019-07-03 RX ADMIN — Medication 975 MILLIGRAM(S): at 20:12

## 2019-07-03 RX ADMIN — SODIUM CHLORIDE 3 MILLILITER(S): 9 INJECTION INTRAMUSCULAR; INTRAVENOUS; SUBCUTANEOUS at 12:41

## 2019-07-03 RX ADMIN — Medication 325 MILLIGRAM(S): at 21:17

## 2019-07-03 RX ADMIN — Medication 975 MILLIGRAM(S): at 19:42

## 2019-07-03 RX ADMIN — Medication 975 MILLIGRAM(S): at 03:50

## 2019-07-03 RX ADMIN — Medication 500 MILLIGRAM(S): at 18:43

## 2019-07-03 NOTE — PROGRESS NOTE ADULT - PROBLEM SELECTOR PLAN 1
- monitor vitals  - pain medications prn - tramadol ordered  - reg diet  - OOb ambulation  - dc home in am

## 2019-07-03 NOTE — PROGRESS NOTE ADULT - PROBLEM SELECTOR PLAN 1
- Monitor UOP  - Pain well controlled, continue current pain regimen  - Increase ambulation, SCDs when not ambulating  - Continue regular diet    Robyn Frankel, MD PGY-1

## 2019-07-03 NOTE — PROGRESS NOTE ADULT - SUBJECTIVE AND OBJECTIVE BOX
OB Progress Note:  PPD#1    S: 39yo  PPD#1 s/p . PP course c/b urinary retention requiring stanton and dizziness. Stanton discontinued this AM. Patient waiting to void spontaneously. Patient feels well. Pain is well controlled. She is tolerating a regular diet and passing flatus. She is ambulating without difficulty. Denies CP/SOB. Denies lightheadedness/dizziness. Denies N/V. Denies heavy vaginal bleeding.    O:  Vitals:  Vital Signs Last 24 Hrs  T(C): 36.8 (2019 05:28), Max: 37 (2019 09:30)  T(F): 98.2 (2019 05:28), Max: 98.6 (2019 09:30)  HR: 80 (2019 05:28) (67 - 123)  BP: 98/59 (2019 05:28) (94/55 - 132/62)  BP(mean): --  RR: 18 (2019 05:28) (18 - 20)  SpO2: 98% (2019 05:28) (60% - 100%)    MEDICATIONS  (STANDING):  acetaminophen   Tablet .. 975 milliGRAM(s) Oral <User Schedule>  ascorbic acid 500 milliGRAM(s) Oral daily  diphtheria/tetanus/pertussis (acellular) Vaccine (ADAcel) 0.5 milliLiter(s) IntraMuscular once  docusate sodium 100 milliGRAM(s) Oral two times a day  ferrous    sulfate 325 milliGRAM(s) Oral three times a day  prenatal multivitamin 1 Tablet(s) Oral daily  sodium chloride 0.9% lock flush 3 milliLiter(s) IV Push every 8 hours      Labs:  Blood type: O Positive  Rubella IgG: RPR: Negative                          8.0<L>   23.28<H> >-----------< 300    (  @ 06:12 )             23.1<L>                        9.7<L>   27.32<H> >-----------< 350    (  @ 15:15 )             28.1<L>                        12.3   13.07<H> >-----------< 378    (  @ 01:15 )             35.8                  Physical Exam:  General: NAD  Abdomen: soft, non-tender, non-distended, fundus firm  Vaginal: No heavy vaginal bleeding  Extremities: No erythema/edema

## 2019-07-03 NOTE — PROGRESS NOTE ADULT - SUBJECTIVE AND OBJECTIVE BOX
patient seen adn examined at bedside.  patient is ambulating, voiding, pos flatus; minimal vaginal bleeding.  pt is breast and bottle feeding.  pt requesting pain medications other than tylenol - states it does not help with rectal pain and she can not take motrin.      GEN: NAD  BREASTS: no breast tenderness or engorgement noted bilaterally  ABD: soft, nondistended, nontender  UTERUS: firm below umbilicus  VAG: minimal blood noted  LE: no calf tenderness or edema noted bilaterally

## 2019-07-04 VITALS
SYSTOLIC BLOOD PRESSURE: 111 MMHG | TEMPERATURE: 98 F | OXYGEN SATURATION: 99 % | HEART RATE: 69 BPM | RESPIRATION RATE: 18 BRPM | DIASTOLIC BLOOD PRESSURE: 67 MMHG

## 2019-07-04 RX ADMIN — Medication 325 MILLIGRAM(S): at 05:24

## 2019-07-04 RX ADMIN — Medication 100 MILLIGRAM(S): at 05:24

## 2019-07-04 RX ADMIN — Medication 975 MILLIGRAM(S): at 06:05

## 2019-07-04 RX ADMIN — Medication 975 MILLIGRAM(S): at 05:25

## 2019-07-04 NOTE — PROGRESS NOTE ADULT - SUBJECTIVE AND OBJECTIVE BOX
OB Progress Note:  PPD#2    S: 39yo PPD#2 s/p  cb RML/3rd degree. Patient feels well. Pain is well controlled. She is tolerating a regular diet and passing flatus. She is voiding spontaneously, and ambulating without difficulty. Denies CP/SOB. Denies lightheadedness/dizziness. Denies N/V. Denies heavy vaginal bleeding.    O:  Vitals:   Vital Signs Last 24 Hrs  T(C): 36.9 (2019 05:33), Max: 36.9 (2019 05:33)  T(F): 98.4 (2019 05:33), Max: 98.4 (2019 05:33)  HR: 69 (2019 05:33) (69 - 97)  BP: 111/67 (2019 05:33) (111/67 - 113/74)  BP(mean): --  RR: 18 (2019 05:33) (18 - 18)  SpO2: 99% (2019 05:33) (99% - 99%)    MEDICATIONS  (STANDING):  acetaminophen   Tablet .. 975 milliGRAM(s) Oral <User Schedule>  ascorbic acid 500 milliGRAM(s) Oral daily  diphtheria/tetanus/pertussis (acellular) Vaccine (ADAcel) 0.5 milliLiter(s) IntraMuscular once  docusate sodium 100 milliGRAM(s) Oral two times a day  ferrous    sulfate 325 milliGRAM(s) Oral three times a day  prenatal multivitamin 1 Tablet(s) Oral daily  sodium chloride 0.9% lock flush 3 milliLiter(s) IV Push every 8 hours    MEDICATIONS  (PRN):  benzocaine 20%/menthol 0.5% Spray 1 Spray(s) Topical every 6 hours PRN for Perineal discomfort  dibucaine 1% Ointment 1 Application(s) Topical every 6 hours PRN Perineal discomfort  diphenhydrAMINE 25 milliGRAM(s) Oral every 6 hours PRN Pruritus  glycerin Suppository - Adult 1 Suppository(s) Rectal at bedtime PRN Constipation  hydrocortisone 1% Cream 1 Application(s) Topical every 6 hours PRN Moderate Pain (4-6)  lanolin Ointment 1 Application(s) Topical every 6 hours PRN nipple soreness  magnesium hydroxide Suspension 30 milliLiter(s) Oral two times a day PRN Constipation  oxyCODONE    IR 5 milliGRAM(s) Oral every 3 hours PRN Moderate to Severe Pain (4-10)  oxyCODONE    IR 5 milliGRAM(s) Oral once PRN Moderate to Severe Pain (4-10)  pramoxine 1%/zinc 5% Cream 1 Application(s) Topical every 4 hours PRN Moderate Pain (4-6)  simethicone 80 milliGRAM(s) Chew every 4 hours PRN Gas  traMADol 25 milliGRAM(s) Oral three times a day PRN Moderate Pain (4 - 6)  witch hazel Pads 1 Application(s) Topical every 4 hours PRN Perineal discomfort      Labs:  Blood type: O Positive  Rubella IgG: RPR: Negative                          8.0<L>   23.28<H> >-----------< 300    (  @ 06:12 )             23.1<L>                        9.7<L>   27.32<H> >-----------< 350    (  @ 15:15 )             28.1<L>                  Physical Exam:  General: NAD  Abdomen: soft, non-tender, non-distended, fundus firm  Vaginal: No heavy vaginal bleeding  Extremities: No erythema/edema

## 2019-07-04 NOTE — PROGRESS NOTE ADULT - PROBLEM SELECTOR PLAN 1
- Pain well controlled, continue current pain regimen  - Increase ambulation, SCDs when not ambulating  - Continue regular diet  - Discharge planning     Latasha Hughes PGY-1

## 2022-01-05 NOTE — OB PROVIDER H&P - NS_SCHEDINDUC_OBGYN_ALL_OB
January 5, 2022    Important [employer] Information    TOOTIE MARINO  [address]  [place] [address]  A Partner in Your Care  Dear Tootie,  Thank you for choosing [employer] for your health plan coverage! I am excited to welcome you as a member of [employer] .  My name is MARCO Manrique  and I will be working with you as your Care Coordinator. [employer] partners with [employer] to provide members with Care Coordination services.  As your Care Coordinator, I can:    Work with you to create a Care Plan to keep you healthy and safe    Help you make appointments to see health care providers     Support you and your family in making health care decisions    Find community services that may interest you    Identify health benefits you are eligible for  What happens next?  To get started, I will call you. I ll ask you a few questions about your health and schedule a time to meet. You will have a chance to ask me questions, too.  Questions?  Call me at [phone] Monday-Friday between 8am and 5pm. TTY: [phone]. I look forward to speaking with you soon.  Sincerely,        MARCO Manrique  [phone]  [email or web address]    cc: member records                                                                                                                CB5 ([facility]) (5-2020)    Civil Rights Notice  Discrimination is against the law. [employer] does not discriminate on the basis of any of the following:    Race    Color    National Origin    Creed    Baptism    Age    Public Assistance Status    Receipt of Health Care Services    Disability (including physical or mental impairment)    Sex (including sex stereotypes and gender identity)    Marital Status    Political Beliefs    Medical Condition    Genetic Information    Sexual Orientation    Claims Experience    Medical History    Health Status    Auxiliary Aids and Services:  [employer] provides auxiliary aids and services, like qualified  interpreters or information in accessible formats, free of charge and in a timely manner, to ensure an equal opportunity to participate in our health care programs. Contact [employer] at [email or web address] or call [phone] (toll free); TTY:719 or at [email or web address].    Language Assistance Services:  [employer] provides translated documents and spoken language interpreting, free of charge and in a timely manner, when language assistance services are necessary to ensure limited English speakers have meaningful access to our information and services. Contact [employer] at [phone] (toll free); TTY: 719 or [email or web address].     Civil Rights Complaints  You have the right to file a discrimination complaint if you believe you were treated in a discriminatory way by [employer]. You may contact any of the following four agencies directly to file a discrimination complaint.      U.S. Department of Health and Human Services  Office for Civil Rights (OCR)  You have the right to file a complaint with the OCR, a federal agency, if you believe you have been discriminated against because of any of the following:    Race    Disability    Color    Sex    National Origin    Age    Pentecostalism (in some cases)    Contact the OCR directly to file a complaint:         Director         U.S. Department of Health and Human Services  Office for Civil Rights         [address] [place] [address]         Customer Response Center: Toll-free: [phone]          TDD: [phone]         Email: [email or web address]    [place] Department of Human Rights (MDHR)  In [place], you have the right to file a complaint with the MD if you believe you have been discriminated against because of any of the following:      Race    Color    National Origin    Pentecostalism    Creed    Sex    Sexual Orientation    Marital Status    Public Assistance  Status    Disability    Contact the [facility] directly to file a complaint:         [place] Department of Human Rights         [address] [address]         [phone] (voice)          [phone] (toll free)         713 or [phone] ([place] Relay)         [phone] (Fax)         [email or web address] (Email)     [place] Department of Human Services (DHS)  You have the right to file a complaint with [facility] if you believe you have been discriminated against in our health care programs because of any of the following:    Race    Color    National Origin    Creed    Hinduism    Age    Public Assistance Status    Receipt of Health Care Services    Disability (including physical or mental impairment)    Sex (including sex stereotypes and gender identity)    Marital Status    Political Beliefs    Medical Condition    Genetic Information    Sexual Orientation    Claims Experience    Medical History    Health Status    Complaints must be in writing and filed within 180 days of the date you discovered the alleged discrimination. The complaint must contain your name and address and describe the discrimination you are complaining about. After we get your complaint, we will review it and notify you in writing about whether we have authority to investigate. If we do, we will investigate the complaint.      [facility] will notify you in writing of the investigation s outcome. You have a right to appeal the outcome if you disagree with the decision. To appeal, you must send a written request to have [facility] review the investigation outcome. Be brief and state why you disagree with the decision. Include additional information you think is important.      If you file a complaint in this way, the people who work for the agency named in the complaint cannot retaliate against you. This means they cannot punish you in any way for filing a complaint. Filing a complaint in this way does not stop you  from seeking out other legal or administration actions.     Contact DHS directly to file a discrimination complaint:        Civil Rights Coordinator        [facility]        Equal Opportunity and Access Division        [address] [address]        [place] [address] [phone] (voice) or use your preferred relay service     [facility] Complaint Notice   You have the right to file a complaint with [employer] if you believe you have been discriminated against because of any of the following:       Medical condition    Health status    Receipt of health care services    Claims experience    Medical history    Genetic information    Disability (including mental or physical impairment)    Marital status    Age    Sex (including sex stereotypes and gender identity)    Sexual orientation    National origin    Race    Color    Presybeterian    Creed    Public assistance status    Political beliefs    You can file a complaint and ask for help in filing a complaint in person or by mail, phone, fax, or email at:     [facility] Civil Rights Coordinator  [employer] [facility]  [address], [place] [address] [phone] (voice and fax) or TTY:715  Email: [email or web address]    American Indians can begin or continue to use [place] and  Health Services (IHS) clinics. We will not require prior approval or impose any conditions for you to get services at these clinics. For elders age 65 years and older this includes Elderly Waiver (EW) services accessed through the [place]. If a doctor or other provider in a [place] or [facility] clinic refers you to a provider in our network, we will not require you to see your primary care provider prior to the referral.       Diabetes

## 2022-01-14 NOTE — OB PROVIDER H&P - NSPRENATALGBS_OBGYN_ALL_OB_GET_DAYS
-- DO NOT REPLY / DO NOT REPLY ALL --  -- Message is from the [facility]--    General Patient Message      Reason for Call: Dr. Tavarez's office needed an updated Clearance based on this patient's visit on 1/11. Can a member of the clinical team please get this faxed over today? The patient's procedure is Thursday and she'll be taken off the schedule if they don't have it by 3PM     Caller Information       Type Contact Phone    01/14/2022 12:19 PM CST Phone (Incoming) Miracle Valdez (Self) [phone] (M)          Alternative phone number: [phone](FAX) Dr. Tavarez's office    Turnaround time given to caller:   \"This message will be sent to [state Provider's name]. The clinical team will fulfill your request as soon as they review your message.\"    
26

## 2022-07-27 NOTE — OB PROVIDER IHI INDUCTION/AUGMENTATION NOTE - NS_OBIHIREPANPSATTEST_OBGYN_ALL_OB
Detail Level: Detailed
General Sunscreen Counseling: I recommended a broad spectrum sunscreen with a SPF of 30 or higher.  I explained that SPF 30 sunscreens block approximately 97 percent of the sun's harmful rays.  Sunscreens should be applied at least 15 minutes prior to expected sun exposure and then every 2 hours after that as long as sun exposure continues. If swimming or exercising sunscreen should be reapplied every 45 minutes to an hour after getting wet or sweating.  One ounce, or the equivalent of a shot glass full of sunscreen, is adequate to protect the skin not covered by a bathing suit. I also recommended a lip balm with a sunscreen as well. Sun protective clothing can be used in lieu of sunscreen but must be worn the entire time you are exposed to the sun's rays.
I have discussed with the Attending the patient's status, as well as the H&P and the fetal status. The Attending agreed with the suggested management.

## 2023-03-16 NOTE — OB PST NOTE - CURRENT PREGNANCY COMPLICATIONS, OB PROFILE
Gestational Diabetes Doxycycline Pregnancy And Lactation Text: This medication is Pregnancy Category D and not consider safe during pregnancy. It is also excreted in breast milk but is considered safe for shorter treatment courses.

## 2023-06-02 NOTE — OB PST NOTE - NO PERTINENT FAMILY HISTORY
[address], [address], [facility], [address] [facility]    ASSESSMENT/PLAN: Clay Alvarez is a 48 y o  [id number] who presents for annual gynecologic exam     Encounter for routine gynecologic examination  - Routine well woman exam completed today  - Cervical Cancer Screening: Current ASCCP Guidelines reviewed  Last Pap: 2020   Next Pap Due: today  - Contraceptive counseling discussed  Current contraception: none  Na     - Breast Cancer Screening: Last Mammogram 2022,  Breast  Cyst   Fu in 2023 negg  Return to   Yearly mammogram   -colonoscopy  3/2023  w fu in 3 years        Additional problems addressed during this visit:  1  Encounter for gynecological examination without abnormal finding    2  Encounter for screening mammogram for malignant neoplasm of breast  Comments:   left breast cyst  stable return to screening   Orders:  -     Mammo screening bilateral w 3d & cad; Future    3  Family history of breast cancer in mother  Comments:  mom at age 78 Pt with neg genetics     4  Post-menopausal  Comments:  Denies Bleeding, bloating and satiety  5  Cervical cancer screening  -     Thinprep Tis Pap and HPV mRNA E6/E7 Reflex HPV 16,18/45        CC:  Annual Gynecologic Examination    HPI: Clay Alvarez is a 48 y o  [id number] who presents for annual gynecologic examination  50-year-old  4 para 3 here for wellness exam   Last menstrual period was 2021 denies bleeding bloating and feeling up right away after eating  History of left breast cysts follow-up 6-month and 2 2023 stable benign return to yearly  Same partner positive to hot flashes a day and 2 at night not affecting lifestyle      The following portions of the patient's history were reviewed and updated as appropriate: She  has a past medical history of Breast cyst, left and Paraganglioma pheochromocytoma  She  has a past surgical history that includes Hysteroscopy w/ endometrial ablation;  Hysteroscopy "w/ polypectomy;  section; Tonsillectomy; Cystectomy; Thyroplasty; Bunionectomy; Colonoscopy; and EGD  Her family history includes Breast cancer in her mother; Diabetes in her maternal grandmother; Hypertension in her father, maternal grandfather, maternal grandmother, and mother; No Known Problems in her daughter, daughter, daughter, maternal aunt, maternal aunt, maternal aunt, paternal aunt, paternal grandfather, paternal grandmother, sister, and sister; Osteoporosis in her maternal grandmother and mother  She  reports that she has never smoked  She has never been exposed to tobacco smoke  She has never used smokeless tobacco  She reports current alcohol use  She reports that she does not use drugs  Current Outpatient Medications   Medication Sig Dispense Refill   • Cholecalciferol 10 MCG (400 UNIT) CHEW Chew     • multivitamin (THERAGRAN) TABS Take 1 tablet by mouth daily       No current facility-administered medications for this visit  She is allergic to cephalexin, gadobenate, nitrofurantoin, and sulfamethoxazole-trimethoprim       Review of Systems   Constitutional: Negative for chills and fever  HENT: Negative for ear pain and sore throat  Eyes: Negative for pain and visual disturbance  Respiratory: Negative for cough and shortness of breath  Cardiovascular: Negative for chest pain and palpitations  Gastrointestinal: Negative for abdominal pain and vomiting  Genitourinary: Negative for dysuria and hematuria  Musculoskeletal: Negative for arthralgias and back pain  Skin: Negative for color change and rash  Neurological: Negative for seizures and syncope  Hematological: Negative  Psychiatric/Behavioral: Negative  All other systems reviewed and are negative  Objective:  /60 (BP Location: Left arm, Patient Position: Sitting, Cuff Size: Standard)   Ht 5' 4\" (1 626 m)   Wt 57 2 kg (126 lb)   BMI 21 63 kg/m²    Physical Exam  Vitals and nursing note reviewed   " Constitutional:       Appearance: Normal appearance  HENT:      Head: Normocephalic  Cardiovascular:      Rate and Rhythm: Normal rate and regular rhythm  Pulses: Normal pulses  Heart sounds: Normal heart sounds  Pulmonary:      Effort: Pulmonary effort is normal       Breath sounds: Normal breath sounds  Chest:      Chest wall: No mass, lacerations, swelling, tenderness or edema  Breasts: Jameson Score is 4  Breasts are symmetrical       Right: Normal  No swelling, bleeding, inverted nipple, mass, nipple discharge, skin change or tenderness  Left: No swelling, bleeding, inverted nipple, mass, nipple discharge, skin change or tenderness  Abdominal:      General: Abdomen is flat  Bowel sounds are normal       Palpations: Abdomen is soft  Genitourinary:     General: Normal vulva  Exam position: Lithotomy position  Pubic Area: No rash  Jameson stage (genital): 4       Labia:         Right: No rash, tenderness or lesion  Left: No rash, tenderness or lesion  Urethra: No urethral pain, urethral swelling or urethral lesion  Vagina: Normal       Cervix: No cervical motion tenderness or discharge  Uterus: Normal        Adnexa: Right adnexa normal and left adnexa normal       Rectum: Normal    Musculoskeletal:         General: Normal range of motion  Cervical back: Neck supple  Lymphadenopathy:      Upper Body:      Right upper body: No supraclavicular, axillary or pectoral adenopathy  Left upper body: No supraclavicular, axillary or pectoral adenopathy  Lower Body: No right inguinal adenopathy  No left inguinal adenopathy  Skin:     General: Skin is warm and dry  Neurological:      General: No focal deficit present  Mental Status: She is alert and oriented to person, place, and time     Psychiatric:         Mood and Affect: Mood normal          Behavior: Behavior normal  <<----- Click to add NO pertinent Family History

## 2023-09-07 NOTE — OB RN DELIVERY SUMMARY - NS_BREASTINHOURA_OBGYN_ALL_OB
Offered, feeding was successful Post-Care Instructions: I reviewed with the patient in detail post-care instructions. Patient is to wear sunprotection, and avoid picking at any of the treated lesions. Pt may apply Vaseline to crusted or scabbing areas. Render In Bullet Format When Appropriate: No Total Number Of Aks Treated: 2 Detail Level: Zone Consent: The patient's consent was obtained including but not limited to risks of crusting, scabbing, blistering, scarring, darker or lighter pigmentary change, recurrence, incomplete removal and infection. Duration Of Freeze Thaw-Cycle (Seconds): 0

## 2023-09-29 NOTE — OB PST NOTE - NSCAFFEINETYPE_GEN_ALL_CORE_SD
LOS: 19 days   Patient Care Team:  Yas Sanchez MD as PCP - General  Yas Sanchez coffee

## 2025-05-07 NOTE — OB RN PATIENT PROFILE - CAREGIVER
Render Post-Care Instructions In Note?: no
Post-Care Instructions: I reviewed with the patient in detail post-care instructions. Patient is to wear sunprotection, and avoid picking at any of the treated lesions. Pt may apply Vaseline to crusted or scabbing areas.
Show Applicator Variable?: Yes
Detail Level: Detailed
Consent: The patient's consent was obtained including but not limited to risks of crusting, scabbing, blistering, scarring, darker or lighter pigmentary change, recurrence, incomplete removal and infection.
Duration Of Freeze Thaw-Cycle (Seconds): 0
Declines
